# Patient Record
Sex: MALE | Race: WHITE | NOT HISPANIC OR LATINO | URBAN - METROPOLITAN AREA
[De-identification: names, ages, dates, MRNs, and addresses within clinical notes are randomized per-mention and may not be internally consistent; named-entity substitution may affect disease eponyms.]

---

## 2024-02-26 ENCOUNTER — APPOINTMENT (RX ONLY)
Dept: URBAN - METROPOLITAN AREA CLINIC 28 | Facility: CLINIC | Age: 54
Setting detail: DERMATOLOGY
End: 2024-02-26

## 2024-02-26 DIAGNOSIS — L82.1 OTHER SEBORRHEIC KERATOSIS: ICD-10-CM

## 2024-02-26 DIAGNOSIS — D485 NEOPLASM OF UNCERTAIN BEHAVIOR OF SKIN: ICD-10-CM

## 2024-02-26 DIAGNOSIS — L81.4 OTHER MELANIN HYPERPIGMENTATION: ICD-10-CM

## 2024-02-26 DIAGNOSIS — L80 VITILIGO: ICD-10-CM

## 2024-02-26 DIAGNOSIS — D22 MELANOCYTIC NEVI: ICD-10-CM

## 2024-02-26 PROBLEM — D22.71 MELANOCYTIC NEVI OF RIGHT LOWER LIMB, INCLUDING HIP: Status: ACTIVE | Noted: 2024-02-26

## 2024-02-26 PROBLEM — D48.5 NEOPLASM OF UNCERTAIN BEHAVIOR OF SKIN: Status: ACTIVE | Noted: 2024-02-26

## 2024-02-26 PROCEDURE — ? DEFER

## 2024-02-26 PROCEDURE — ? OBSERVATION AND MEASURE

## 2024-02-26 PROCEDURE — ? COUNSELING

## 2024-02-26 PROCEDURE — 99203 OFFICE O/P NEW LOW 30 MIN: CPT

## 2024-02-26 ASSESSMENT — LOCATION DETAILED DESCRIPTION DERM
LOCATION DETAILED: 3RD WEB SPACE LEFT HAND
LOCATION DETAILED: DORSAL GLANS
LOCATION DETAILED: RIGHT ANTERIOR SCROTUM
LOCATION DETAILED: LEFT DORSAL INDEX METACARPOPHALANGEAL JOINT
LOCATION DETAILED: RIGHT PROXIMAL PRETIBIAL REGION
LOCATION DETAILED: RIGHT ANKLE
LOCATION DETAILED: LEFT ANTERIOR SCROTUM

## 2024-02-26 ASSESSMENT — LOCATION ZONE DERM
LOCATION ZONE: LEG
LOCATION ZONE: GENITALIA
LOCATION ZONE: PENIS
LOCATION ZONE: HAND

## 2024-02-26 ASSESSMENT — LOCATION SIMPLE DESCRIPTION DERM
LOCATION SIMPLE: SCROTUM
LOCATION SIMPLE: LEFT HAND
LOCATION SIMPLE: RIGHT ANKLE
LOCATION SIMPLE: RIGHT PRETIBIAL REGION
LOCATION SIMPLE: PENIS

## 2024-02-26 NOTE — HPI: EVALUATION OF SKIN LESION(S)
Hpi Title: Evaluation of a Skin Lesion
Additional History: Patient reports a spot on the left lateral brow, been there for about two years. He thinks it might be getting a little bigger, but no symptoms. \\n

## 2024-02-26 NOTE — PROCEDURE: OBSERVATION
Detail Level: Detailed
Size Of Lesion: 2.5 X 4.5 mm
Morphology Per Location (Optional): Old and stable

## 2024-02-26 NOTE — PROCEDURE: DEFER
X Size Of Lesion In Cm (Optional): 0
Triangulation (Location Of Lesion In Relation To Distance From Anatomical Landmarks): Far left lateral eyebrow
Detail Level: Detailed
Instructions (Optional): Biopsy was recommended due to the history of change and for peace of mind/reassurance about its nature.
Scheduling Instructions (Optional): -Discussed that biopsy is recommended for this spot. Patient will try to call his insurance to see which practice might be able to cover it. Discussed to not wait longer to biopsy the spot.
Introduction Text (Please End With A Colon): The following procedure was deferred: biopsy
Instructions (Optional): Biopsy was recommended because of its appearance, and he believes it is new. We discussed how melanoma can look and behave.
Size Of Lesion In Cm (Optional): 0.4
Triangulation (Location Of Lesion In Relation To Distance From Anatomical Landmarks): left proximal anteromedial shin
X Size Of Lesion In Cm (Optional): 0.9

## 2024-02-26 NOTE — PROCEDURE: COUNSELING
Detail Level: Detailed
Patient Specific Counseling (Will Not Stick From Patient To Patient): -\\n\\nDiscussed that there is a newer medication called opzelura that patient could consider. Patient shares that his vitiligo has been stable and elects continuing no treatment for now. If he develops new patches in cosmetically important areas like the face, he'll see a dermatologist early for best treatment outcomes.

## 2024-02-26 NOTE — HPI: OTHER
Condition:: old vitiligo
Please Describe Your Condition:: The patient has a history of vitiligo on the top of his left hand on on front of his right ankle and in the genital areas, but it is stable in those same places for many years now and he has no symptoms. He wonders if there is any otc treatment.